# Patient Record
Sex: FEMALE | Race: OTHER | NOT HISPANIC OR LATINO | Employment: OTHER | URBAN - METROPOLITAN AREA
[De-identification: names, ages, dates, MRNs, and addresses within clinical notes are randomized per-mention and may not be internally consistent; named-entity substitution may affect disease eponyms.]

---

## 2020-10-14 ENCOUNTER — NEW PATIENT COMPREHENSIVE (OUTPATIENT)
Dept: URBAN - METROPOLITAN AREA CLINIC 38 | Facility: CLINIC | Age: 55
End: 2020-10-14

## 2020-10-14 DIAGNOSIS — H52.13: ICD-10-CM

## 2020-10-14 DIAGNOSIS — H52.4: ICD-10-CM

## 2020-10-14 DIAGNOSIS — H52.203: ICD-10-CM

## 2020-10-14 PROCEDURE — 92015 DETERMINE REFRACTIVE STATE: CPT

## 2020-10-14 PROCEDURE — 92004 COMPRE OPH EXAM NEW PT 1/>: CPT

## 2020-10-14 ASSESSMENT — TONOMETRY
OD_IOP_MMHG: 17
OS_IOP_MMHG: 16

## 2020-10-14 ASSESSMENT — VISUAL ACUITY
OD_CC: J1
OS_CC: 20/20-1
OS_CC: J1
OS_SC: 20/25
OD_CC: 20/20-2
OD_SC: 20/25

## 2020-10-14 ASSESSMENT — KERATOMETRY
OD_K1POWER_DIOPTERS: 41
OS_AXISANGLE_DEGREES: 155
OS_AXISANGLE2_DEGREES: 65
OD_K2POWER_DIOPTERS: 40.5
OS_K1POWER_DIOPTERS: 40.5
OS_K2POWER_DIOPTERS: 40.75
OD_AXISANGLE2_DEGREES: 105
OD_AXISANGLE_DEGREES: 15

## 2021-02-25 ENCOUNTER — APPOINTMENT (RX ONLY)
Dept: RURAL CLINIC 4 | Facility: CLINIC | Age: 56
Setting detail: DERMATOLOGY
End: 2021-02-25

## 2021-02-25 VITALS — TEMPERATURE: 97.3 F

## 2021-02-25 DIAGNOSIS — Z41.9 ENCOUNTER FOR PROCEDURE FOR PURPOSES OTHER THAN REMEDYING HEALTH STATE, UNSPECIFIED: ICD-10-CM

## 2021-02-25 DIAGNOSIS — L98.8 OTHER SPECIFIED DISORDERS OF THE SKIN AND SUBCUTANEOUS TISSUE: ICD-10-CM

## 2021-02-25 PROCEDURE — ? BOTOX

## 2021-02-25 PROCEDURE — ? ADDITIONAL NOTES

## 2021-02-25 PROCEDURE — ? FILLERS

## 2021-02-25 ASSESSMENT — LOCATION SIMPLE DESCRIPTION DERM
LOCATION SIMPLE: GLABELLA
LOCATION SIMPLE: RIGHT CHEEK
LOCATION SIMPLE: LEFT CHEEK

## 2021-02-25 ASSESSMENT — LOCATION DETAILED DESCRIPTION DERM
LOCATION DETAILED: GLABELLA
LOCATION DETAILED: LEFT CENTRAL MALAR CHEEK
LOCATION DETAILED: RIGHT CENTRAL MALAR CHEEK

## 2021-02-25 ASSESSMENT — LOCATION ZONE DERM: LOCATION ZONE: FACE

## 2021-02-25 NOTE — PROCEDURE: ADDITIONAL NOTES
Render Risk Assessment In Note?: no
Additional Notes: Will consider restylane to marionette lines next visit
Detail Level: Simple

## 2021-02-25 NOTE — PROCEDURE: FILLERS
Detail Level: Detailed
Price (Use Numbers Only, No Special Characters Or $): [address]
Use Map Statement For Sites (Optional): No
Map Statment: See [address] for Complete Details
Anesthesia Type: 1% lidocaine with epinephrine
Additional Anesthesia Volume In Cc: 6
Topical Anesthesia?: 5% lidocaine
Filler: Voluma
Cheeks Filler Volume In Cc: 1
Dorsal Hands Filler  Volume In Cc: 0
Lot #: [id number]
Expiration Date (Month Year): 3/14/2022
Consent: Written consent obtained. Risks include but not limited to bruising, beading, irregular texture, ulceration, infection, allergic reaction, scar formation, incomplete augmentation, temporary nature, procedural pain.
Post-Care Instructions: Patient instructed to apply ice to reduce swelling.

## 2021-02-25 NOTE — PROCEDURE: BOTOX
Mentalis Units: 0
Show Additional Area 5: Yes
Expiration Date (Month Year): 1/2023
Length Of Topical Anesthesia Application (Optional): 15 minutes
Detail Level: Detailed
Show Lcl Units: No
Periorbital Skin Units: [address] [facility]
Price (Use Numbers Only, No Special Characters Or $): [address]
Consent: Written consent obtained. Risks include but not limited to lid/brow ptosis, bruising, swelling, diplopia, temporary effect, incomplete chemical denervation.
Lot #: [id number]
Topical Anesthesia?: 5% lidocaine
Dilution (U/0.1 Cc): 4
Glabellar Complex Units: [address] [facility]
Additional Area 1 Location: perioral 4 units
Post-Care Instructions: Patient instructed to not lie down for 4 hours and limit physical activity for 24 hours.

## 2021-03-10 ENCOUNTER — APPOINTMENT (RX ONLY)
Dept: RURAL CLINIC 4 | Facility: CLINIC | Age: 56
Setting detail: DERMATOLOGY
End: 2021-03-10

## 2021-03-10 DIAGNOSIS — Z41.9 ENCOUNTER FOR PROCEDURE FOR PURPOSES OTHER THAN REMEDYING HEALTH STATE, UNSPECIFIED: ICD-10-CM

## 2021-03-10 DIAGNOSIS — L98.8 OTHER SPECIFIED DISORDERS OF THE SKIN AND SUBCUTANEOUS TISSUE: ICD-10-CM

## 2021-03-10 PROCEDURE — ? COUNSELING

## 2021-03-10 PROCEDURE — ? BOTOX

## 2021-03-10 PROCEDURE — ? RESTYLANE INJECTION

## 2021-03-10 ASSESSMENT — LOCATION ZONE DERM: LOCATION ZONE: FACE

## 2021-03-10 ASSESSMENT — LOCATION SIMPLE DESCRIPTION DERM
LOCATION SIMPLE: RIGHT CHEEK
LOCATION SIMPLE: GLABELLA
LOCATION SIMPLE: LEFT CHEEK

## 2021-03-10 ASSESSMENT — LOCATION DETAILED DESCRIPTION DERM
LOCATION DETAILED: GLABELLA
LOCATION DETAILED: LEFT CENTRAL BUCCAL CHEEK
LOCATION DETAILED: RIGHT CENTRAL BUCCAL CHEEK

## 2021-03-10 NOTE — PROCEDURE: RESTYLANE INJECTION
Additional Area 2 Volume In Cc: 0
Anesthesia Volume In Cc: 0.5
Detail Level: Detailed
Topical Anesthesia?: Numb Master (9.6% lidocaine)
Expiration Date (Month Year): 3/31/2023
Filler: Restylane
Map Statement: See [address] for Complete Details
Price (Use Numbers Only, No Special Characters Or $): [address] [facility] [address]
Anesthesia Type: 1% lidocaine with epinephrine
Lot #: [id number]
Consent: Written consent obtained. Risks include but not limited to bruising, beading, irregular texture, ulceration, infection, allergic reaction, scar formation, incomplete augmentation, temporary nature, procedural pain.
Procedural Text: The filler was administered to the treatment areas noted above.
Marionette Lines Filler  Volume In Cc: 1
Include Cannula Information In Note?: No
Post-Care Instructions: Patient instructed to apply ice to reduce swelling.

## 2021-03-10 NOTE — PROCEDURE: BOTOX
Show Ucl Units: No
Depressor Anguli Oris Units: 0
Show Anterior Platysmal Band Units: Yes
Consent: Written consent obtained. Risks include but not limited to lid/brow ptosis, bruising, swelling, diplopia, temporary effect, incomplete chemical denervation.
Lot #: [id number]
Post-Care Instructions: Patient instructed to not lie down for 4 hours and limit physical activity for 24 hours.
Dilution (U/0.1 Cc): 4
Glabellar Complex Units: 2
Detail Level: Detailed
Expiration Date (Month Year): 8/2023

## 2021-03-10 NOTE — PROCEDURE: MIPS QUALITY
Additional Notes: Patient not of age for pneumonia vaccine
Quality 111:Pneumonia Vaccination Status For Older Adults: Pneumococcal Vaccination not Administered or Previously Received, Reason not Otherwise Specified
Detail Level: Detailed
Quality 130: Documentation Of Current Medications In The Medical Record: Current Medications Documented

## 2021-07-30 ENCOUNTER — APPOINTMENT (RX ONLY)
Dept: RURAL CLINIC 4 | Facility: CLINIC | Age: 56
Setting detail: DERMATOLOGY
End: 2021-07-30

## 2021-07-30 DIAGNOSIS — L98.8 OTHER SPECIFIED DISORDERS OF THE SKIN AND SUBCUTANEOUS TISSUE: ICD-10-CM

## 2021-07-30 PROCEDURE — ? COUNSELING

## 2021-07-30 PROCEDURE — ? ADDITIONAL NOTES

## 2021-07-30 PROCEDURE — ? BOTOX

## 2021-07-30 ASSESSMENT — LOCATION DETAILED DESCRIPTION DERM: LOCATION DETAILED: GLABELLA

## 2021-07-30 ASSESSMENT — LOCATION ZONE DERM: LOCATION ZONE: FACE

## 2021-07-30 ASSESSMENT — LOCATION SIMPLE DESCRIPTION DERM: LOCATION SIMPLE: GLABELLA

## 2021-07-30 NOTE — PROCEDURE: BOTOX
Show Ucl Units: No
Depressor Anguli Oris Units: 4
Nasal Root Units: 0
Show Anterior Platysmal Band Units: Yes
Consent: Written consent obtained. Risks include but not limited to lid/brow ptosis, bruising, swelling, diplopia, temporary effect, incomplete chemical denervation.
Lot #: [id number]
Post-Care Instructions: Patient instructed to not lie down for 4 hours and limit physical activity for 24 hours.
Topical Anesthesia?: Numb Master (9.6% lidocaine)
Additional Area 1 Location: crows feet
Forehead Units: 8
Dilution (U/0.1 Cc): 1
Additional Area 1 Units: [address] [facility]
Glabellar Complex Units: 24
Detail Level: Detailed
Additional Area 2 Location: perioral
Price (Use Numbers Only, No Special Characters Or $): [address]
Expiration Date (Month Year): 10/2023

## 2021-07-30 NOTE — PROCEDURE: ADDITIONAL NOTES
Render Risk Assessment In Note?: no
Additional Notes: Botox and cheek  3 months
Detail Level: Simple

## 2021-09-24 ENCOUNTER — APPOINTMENT (RX ONLY)
Dept: RURAL CLINIC 4 | Facility: CLINIC | Age: 56
Setting detail: DERMATOLOGY
End: 2021-09-24

## 2021-09-24 DIAGNOSIS — Z41.9 ENCOUNTER FOR PROCEDURE FOR PURPOSES OTHER THAN REMEDYING HEALTH STATE, UNSPECIFIED: ICD-10-CM

## 2021-09-24 PROCEDURE — ? BOTOX

## 2021-09-24 PROCEDURE — ? PRESCRIPTION

## 2021-09-24 RX ORDER — TRETIONIN 0.25 MG/G
THIN CREAM TOPICAL QHS
Qty: 20 | Refills: 3 | Status: ERX | COMMUNITY
Start: 2021-09-24

## 2021-09-24 RX ADMIN — TRETIONIN THIN: 0.25 CREAM TOPICAL at 00:00

## 2021-09-24 NOTE — PROCEDURE: BOTOX
Detail Level: Detailed
Show Levator Superior Units: Yes
Price (Use Numbers Only, No Special Characters Or $): [address]
Periorbital Skin Units: 6
Inferior Lateral Orbicularis Oculi Units: 0
Show Mentalis Units: No
Expiration Date (Month Year): 03/24
Depressor Anguli Oris Units: 4
Consent: Written consent obtained. Risks include but not limited to lid/brow ptosis, bruising, swelling, diplopia, temporary effect, incomplete chemical denervation.
Lot #: [id number]
Post-Care Instructions: Patient instructed to not lie down for 4 hours and limit physical activity for 24 hours.
Additional Area 1 Location: eklsey oral
Forehead Units: 2

## 2021-12-09 ENCOUNTER — APPOINTMENT (RX ONLY)
Dept: RURAL CLINIC 4 | Facility: CLINIC | Age: 56
Setting detail: DERMATOLOGY
End: 2021-12-09

## 2021-12-09 DIAGNOSIS — Z41.9 ENCOUNTER FOR PROCEDURE FOR PURPOSES OTHER THAN REMEDYING HEALTH STATE, UNSPECIFIED: ICD-10-CM

## 2021-12-09 PROCEDURE — ? RESTYLANE-L INJECTION

## 2021-12-09 PROCEDURE — ? BOTOX

## 2021-12-09 ASSESSMENT — LOCATION SIMPLE DESCRIPTION DERM
LOCATION SIMPLE: LEFT TEMPLE
LOCATION SIMPLE: RIGHT LIP
LOCATION SIMPLE: LEFT CHEEK
LOCATION SIMPLE: RIGHT CHEEK
LOCATION SIMPLE: GLABELLA
LOCATION SIMPLE: LEFT EYEBROW
LOCATION SIMPLE: LEFT LIP
LOCATION SIMPLE: RIGHT EYEBROW
LOCATION SIMPLE: RIGHT TEMPLE
LOCATION SIMPLE: LEFT EYELID

## 2021-12-09 ASSESSMENT — LOCATION DETAILED DESCRIPTION DERM
LOCATION DETAILED: LEFT SUPERIOR MEDIAL BUCCAL CHEEK
LOCATION DETAILED: LEFT MEDIAL BUCCAL CHEEK
LOCATION DETAILED: LEFT UPPER CUTANEOUS LIP
LOCATION DETAILED: RIGHT INFERIOR TEMPLE
LOCATION DETAILED: RIGHT INFERIOR MEDIAL BUCCAL CHEEK
LOCATION DETAILED: RIGHT SUPERIOR CENTRAL MALAR CHEEK
LOCATION DETAILED: LEFT LOWER CUTANEOUS LIP
LOCATION DETAILED: RIGHT MEDIAL BUCCAL CHEEK
LOCATION DETAILED: LEFT LATERAL CANTHUS
LOCATION DETAILED: GLABELLA
LOCATION DETAILED: LEFT INFERIOR TEMPLE
LOCATION DETAILED: RIGHT LOWER CUTANEOUS LIP
LOCATION DETAILED: LEFT NASOLABIAL FOLD
LOCATION DETAILED: RIGHT MEDIAL EYEBROW
LOCATION DETAILED: RIGHT UPPER CUTANEOUS LIP
LOCATION DETAILED: RIGHT NASOLABIAL FOLD
LOCATION DETAILED: RIGHT INFERIOR MEDIAL MALAR CHEEK
LOCATION DETAILED: LEFT MEDIAL EYEBROW

## 2021-12-09 ASSESSMENT — LOCATION ZONE DERM
LOCATION ZONE: EYELID
LOCATION ZONE: LIP
LOCATION ZONE: FACE

## 2021-12-09 NOTE — PROCEDURE: BOTOX
Additional Area 2 Location: crows feet
Detail Level: Detailed
Show Depressor Anguli Units: Yes
Additional Area 6 Units: 0
Show Right And Left Brow Units: No
Post-Care Instructions: Patient instructed to not lie down for 4 hours and limit physical activity for 24 hours.
Expiration Date (Month Year): 04/2024
Additional Area 2 Units: [address] [facility]
Lot #: [id number]
Additional Area 1 Location: upper lip
Dilution (U/0.1 Cc): 4
Consent: Written consent obtained. Risks include but not limited to lid/brow ptosis, bruising, swelling, diplopia, temporary effect, incomplete chemical denervation.
Glabellar Complex Units: 12

## 2021-12-09 NOTE — PROCEDURE: RESTYLANE-L INJECTION
Nasolabial Folds Filler Volume In Cc: 0.5
Dorsal Hands Filler  Volume In Cc: 0
Anesthesia Type: 1% lidocaine with epinephrine
Use Map Statement For Sites (Optional): No
Number Of Syringes (Required For Inventory): 1
Lot #: [id number]
Consent: Written consent obtained. Risks include but not limited to bruising, beading, irregular texture, ulceration, infection, allergic reaction, scar formation, incomplete augmentation, temporary nature, procedural pain.
Procedural Text: The filler was administered to the treatment areas noted above.
Topical Anesthesia?: 5% lidocaine
Detail Level: Detailed
Post-Care Instructions: Patient instructed to apply ice to reduce swelling.
Map Statement: See [address] for Complete Details
Filler: Restylane -L
Expiration Date (Month Year): 08/31/2023

## 2021-12-22 ENCOUNTER — COMPREHENSIVE EXAM (OUTPATIENT)
Dept: URBAN - METROPOLITAN AREA CLINIC 38 | Facility: CLINIC | Age: 56
End: 2021-12-22

## 2021-12-22 DIAGNOSIS — H52.203: ICD-10-CM

## 2021-12-22 DIAGNOSIS — H52.4: ICD-10-CM

## 2021-12-22 DIAGNOSIS — H52.13: ICD-10-CM

## 2021-12-22 PROCEDURE — 92015 DETERMINE REFRACTIVE STATE: CPT

## 2021-12-22 PROCEDURE — 92014 COMPRE OPH EXAM EST PT 1/>: CPT

## 2021-12-22 ASSESSMENT — KERATOMETRY
OD_K2POWER_DIOPTERS: 40.5
OD_AXISANGLE2_DEGREES: 105
OD_AXISANGLE_DEGREES: 15
OS_AXISANGLE_DEGREES: 155
OD_K1POWER_DIOPTERS: 41
OS_K1POWER_DIOPTERS: 40.5
OS_AXISANGLE2_DEGREES: 65
OS_K2POWER_DIOPTERS: 40.75

## 2021-12-22 ASSESSMENT — VISUAL ACUITY
OS_SC: 20/20
OS_CC: J1
OD_CC: 20/20-1
OS_SC: J3
OD_SC: J3
OS_CC: 20/25+2
OD_CC: J1
OD_SC: 20/25+2

## 2021-12-22 ASSESSMENT — TONOMETRY
OS_IOP_MMHG: 12
OD_IOP_MMHG: 10

## 2022-03-11 ENCOUNTER — APPOINTMENT (RX ONLY)
Dept: RURAL CLINIC 4 | Facility: CLINIC | Age: 57
Setting detail: DERMATOLOGY
End: 2022-03-11

## 2022-03-11 DIAGNOSIS — Z41.9 ENCOUNTER FOR PROCEDURE FOR PURPOSES OTHER THAN REMEDYING HEALTH STATE, UNSPECIFIED: ICD-10-CM

## 2022-03-11 PROCEDURE — ? BOTOX

## 2022-03-11 ASSESSMENT — LOCATION SIMPLE DESCRIPTION DERM
LOCATION SIMPLE: RIGHT LIP
LOCATION SIMPLE: RIGHT FOREHEAD
LOCATION SIMPLE: RIGHT TEMPLE
LOCATION SIMPLE: LEFT EYELID
LOCATION SIMPLE: GLABELLA
LOCATION SIMPLE: LEFT LIP

## 2022-03-11 ASSESSMENT — LOCATION ZONE DERM
LOCATION ZONE: EYELID
LOCATION ZONE: LIP
LOCATION ZONE: FACE

## 2022-03-11 ASSESSMENT — LOCATION DETAILED DESCRIPTION DERM
LOCATION DETAILED: RIGHT INFERIOR TEMPLE
LOCATION DETAILED: RIGHT UPPER CUTANEOUS LIP
LOCATION DETAILED: GLABELLA
LOCATION DETAILED: RIGHT MEDIAL FOREHEAD
LOCATION DETAILED: LEFT UPPER CUTANEOUS LIP
LOCATION DETAILED: LEFT LATERAL CANTHUS

## 2022-03-11 NOTE — PROCEDURE: BOTOX
Mentalis Units: 0
Show Depressor Anguli Units: Yes
Show Right And Left Periorbital Units: No
Post-Care Instructions: Patient instructed to not lie down for 4 hours and limit physical activity for 24 hours.
Glabellar Complex Units: [address] [address]
Consent: Written consent obtained. Risks include but not limited to lid/brow ptosis, bruising, swelling, diplopia, temporary effect, incomplete chemical denervation.
Lot #: [id number]
Detail Level: Detailed
Length Of Topical Anesthesia Application (Optional): 15 minutes
Periorbital Skin Units: [address] [facility]
Dilution (U/0.1 Cc): 4
Forehead Units: 8
Expiration Date (Month Year): 05/2024
Additional Area 1 Location: perioral 4 units

## 2022-03-15 ENCOUNTER — RX ONLY (OUTPATIENT)
Age: 57
Setting detail: RX ONLY
End: 2022-03-15

## 2022-03-15 RX ORDER — TRETIONIN 0.25 MG/G
PEA SIZED AMOUNT CREAM TOPICAL QHS
Qty: 20 | Refills: 3 | Status: ERX

## 2022-12-19 ENCOUNTER — COMPREHENSIVE EXAM (OUTPATIENT)
Dept: URBAN - METROPOLITAN AREA CLINIC 38 | Facility: CLINIC | Age: 57
End: 2022-12-19

## 2022-12-19 PROCEDURE — 92015 DETERMINE REFRACTIVE STATE: CPT

## 2022-12-19 PROCEDURE — 92014 COMPRE OPH EXAM EST PT 1/>: CPT

## 2022-12-19 ASSESSMENT — KERATOMETRY
OD_K2POWER_DIOPTERS: 40.5
OS_K1POWER_DIOPTERS: 40.5
OD_AXISANGLE_DEGREES: 15
OS_AXISANGLE2_DEGREES: 65
OS_AXISANGLE_DEGREES: 155
OD_K1POWER_DIOPTERS: 41
OD_AXISANGLE2_DEGREES: 105
OS_K2POWER_DIOPTERS: 40.75

## 2022-12-19 ASSESSMENT — TONOMETRY
OS_IOP_MMHG: 18
OD_IOP_MMHG: 16

## 2022-12-19 ASSESSMENT — VISUAL ACUITY
OS_SC: J4
OD_SC: 20/25
OS_SC: 20/20
OD_SC: J3-

## 2023-01-09 ENCOUNTER — EMERGENCY VISIT (OUTPATIENT)
Dept: URBAN - METROPOLITAN AREA CLINIC 38 | Facility: CLINIC | Age: 58
End: 2023-01-09

## 2023-01-09 DIAGNOSIS — B02.39: ICD-10-CM

## 2023-01-09 DIAGNOSIS — H04.123: ICD-10-CM

## 2023-01-09 PROCEDURE — 92012 INTRM OPH EXAM EST PATIENT: CPT

## 2023-01-09 ASSESSMENT — VISUAL ACUITY
OS_CC: 20/20-2
OD_CC: 20/20

## 2023-01-09 ASSESSMENT — KERATOMETRY
OS_K2POWER_DIOPTERS: 40.75
OS_AXISANGLE_DEGREES: 155
OD_K2POWER_DIOPTERS: 40.5
OD_AXISANGLE_DEGREES: 15
OS_K1POWER_DIOPTERS: 40.5
OS_AXISANGLE2_DEGREES: 65
OD_AXISANGLE2_DEGREES: 105
OD_K1POWER_DIOPTERS: 41

## 2023-01-09 ASSESSMENT — TONOMETRY
OD_IOP_MMHG: 15
OS_IOP_MMHG: 16

## 2023-01-16 ENCOUNTER — FOLLOW UP (OUTPATIENT)
Dept: URBAN - METROPOLITAN AREA CLINIC 38 | Facility: CLINIC | Age: 58
End: 2023-01-16

## 2023-01-16 DIAGNOSIS — B02.39: ICD-10-CM

## 2023-01-16 DIAGNOSIS — H04.123: ICD-10-CM

## 2023-01-16 PROCEDURE — 92012 INTRM OPH EXAM EST PATIENT: CPT

## 2023-01-16 RX ORDER — MOXIFLOXACIN HYDROCHLORIDE 5 MG/ML: 1 SOLUTION/ DROPS OPHTHALMIC

## 2023-01-16 RX ORDER — ERYTHROMYCIN 5 MG/G: OINTMENT OPHTHALMIC EVERY EVENING

## 2023-01-16 ASSESSMENT — TONOMETRY
OD_IOP_MMHG: 13
OS_IOP_MMHG: 14

## 2023-01-16 ASSESSMENT — KERATOMETRY
OD_K1POWER_DIOPTERS: 41
OS_AXISANGLE_DEGREES: 155
OD_AXISANGLE_DEGREES: 15
OD_K2POWER_DIOPTERS: 40.5
OD_AXISANGLE2_DEGREES: 105
OS_AXISANGLE2_DEGREES: 65
OS_K2POWER_DIOPTERS: 40.75
OS_K1POWER_DIOPTERS: 40.5

## 2023-01-16 ASSESSMENT — VISUAL ACUITY
OS_CC: 20/20
OU_CC: 20/20
OD_CC: 20/25

## 2023-01-20 ENCOUNTER — FOLLOW UP (OUTPATIENT)
Dept: URBAN - METROPOLITAN AREA CLINIC 38 | Facility: CLINIC | Age: 58
End: 2023-01-20

## 2023-01-20 DIAGNOSIS — B02.39: ICD-10-CM

## 2023-01-20 DIAGNOSIS — H04.123: ICD-10-CM

## 2023-01-20 PROCEDURE — 92012 INTRM OPH EXAM EST PATIENT: CPT

## 2023-01-20 PROCEDURE — 68761Q PUNCTAL PLUG/QUINTESS DISSOLVABLE PLUG/EACH

## 2023-01-20 PROCEDURE — A4262 TEMPORARY TEAR DUCT PLUG: HCPCS

## 2023-01-20 ASSESSMENT — VISUAL ACUITY
OS_CC: 20/20
OD_CC: 20/20-
OU_CC: 20/20

## 2023-01-20 ASSESSMENT — KERATOMETRY
OS_AXISANGLE2_DEGREES: 65
OD_K2POWER_DIOPTERS: 40.5
OS_K2POWER_DIOPTERS: 40.75
OD_AXISANGLE2_DEGREES: 105
OD_AXISANGLE_DEGREES: 15
OD_K1POWER_DIOPTERS: 41
OS_AXISANGLE_DEGREES: 155
OS_K1POWER_DIOPTERS: 40.5

## 2023-01-20 ASSESSMENT — TONOMETRY
OS_IOP_MMHG: 16
OD_IOP_MMHG: 16

## 2023-01-23 ENCOUNTER — RX ONLY (OUTPATIENT)
Age: 58
Setting detail: RX ONLY
End: 2023-01-23

## 2023-01-23 ENCOUNTER — FOLLOW UP (OUTPATIENT)
Dept: URBAN - METROPOLITAN AREA CLINIC 38 | Facility: CLINIC | Age: 58
End: 2023-01-23

## 2023-01-23 DIAGNOSIS — B02.39: ICD-10-CM

## 2023-01-23 DIAGNOSIS — H04.123: ICD-10-CM

## 2023-01-23 PROCEDURE — 92012 INTRM OPH EXAM EST PATIENT: CPT

## 2023-01-23 RX ORDER — PREDNISOLONE ACETATE 10 MG/ML: 1 SUSPENSION/ DROPS OPHTHALMIC

## 2023-01-23 RX ORDER — TRETIONIN 0.25 MG/G
PEA SIZED AMOUNT CREAM TOPICAL QHS
Qty: 20 | Refills: 3 | Status: ERX

## 2023-01-23 ASSESSMENT — KERATOMETRY
OD_K2POWER_DIOPTERS: 40.5
OS_K2POWER_DIOPTERS: 40.75
OD_AXISANGLE_DEGREES: 15
OD_K1POWER_DIOPTERS: 41
OS_K1POWER_DIOPTERS: 40.5
OD_AXISANGLE2_DEGREES: 105
OS_AXISANGLE2_DEGREES: 65
OS_AXISANGLE_DEGREES: 155

## 2023-01-23 ASSESSMENT — TONOMETRY
OS_IOP_MMHG: 16
OD_IOP_MMHG: 17

## 2023-01-23 ASSESSMENT — VISUAL ACUITY
OS_SC: 20/25
OU_SC: 20/25
OD_SC: 20/25

## 2023-01-30 ENCOUNTER — FOLLOW UP (OUTPATIENT)
Dept: URBAN - METROPOLITAN AREA CLINIC 38 | Facility: CLINIC | Age: 58
End: 2023-01-30

## 2023-01-30 DIAGNOSIS — B02.39: ICD-10-CM

## 2023-01-30 DIAGNOSIS — H04.123: ICD-10-CM

## 2023-01-30 PROCEDURE — 92012 INTRM OPH EXAM EST PATIENT: CPT

## 2023-01-30 ASSESSMENT — KERATOMETRY
OD_K1POWER_DIOPTERS: 41
OS_K1POWER_DIOPTERS: 40.5
OS_AXISANGLE_DEGREES: 155
OD_AXISANGLE_DEGREES: 15
OS_AXISANGLE2_DEGREES: 65
OD_AXISANGLE2_DEGREES: 105
OS_K2POWER_DIOPTERS: 40.75
OD_K2POWER_DIOPTERS: 40.5

## 2023-01-30 ASSESSMENT — TONOMETRY
OS_IOP_MMHG: 17
OD_IOP_MMHG: 16

## 2023-01-30 ASSESSMENT — VISUAL ACUITY
OD_SC: 20/25-2
OS_SC: 20/25

## 2023-12-27 ASSESSMENT — KERATOMETRY
OD_AXISANGLE2_DEGREES: 105
OS_K2POWER_DIOPTERS: 40.75
OS_AXISANGLE2_DEGREES: 65
OD_K2POWER_DIOPTERS: 40.5
OD_K1POWER_DIOPTERS: 41
OS_K1POWER_DIOPTERS: 40.5
OS_AXISANGLE_DEGREES: 155
OD_AXISANGLE_DEGREES: 15

## 2023-12-28 ENCOUNTER — COMPREHENSIVE EXAM (OUTPATIENT)
Dept: URBAN - METROPOLITAN AREA CLINIC 43 | Facility: CLINIC | Age: 58
End: 2023-12-28

## 2023-12-28 DIAGNOSIS — H52.4: ICD-10-CM

## 2023-12-28 DIAGNOSIS — H52.13: ICD-10-CM

## 2023-12-28 DIAGNOSIS — H52.203: ICD-10-CM

## 2023-12-28 PROCEDURE — 92014 COMPRE OPH EXAM EST PT 1/>: CPT

## 2023-12-28 PROCEDURE — 92015 DETERMINE REFRACTIVE STATE: CPT

## 2023-12-28 ASSESSMENT — VISUAL ACUITY
OU_CC: J1
OU_SC: J2-
OS_SC: J4+
OD_SC: 20/30+2
OD_CC: 20/25+1
OS_CC: J1-
OU_SC: 20/20
OU_CC: 20/20
OS_CC: 20/20
OD_BAT: 20/20-2
OS_BAT: 20/20-1
OD_SC: J6-
OD_CC: J2
OS_SC: 20/20

## 2023-12-28 ASSESSMENT — TONOMETRY
OD_IOP_MMHG: 16
OS_IOP_MMHG: 15

## 2024-11-25 ENCOUNTER — COMPREHENSIVE EXAM (OUTPATIENT)
Dept: URBAN - METROPOLITAN AREA CLINIC 38 | Facility: CLINIC | Age: 59
End: 2024-11-25

## 2024-11-25 DIAGNOSIS — H52.13: ICD-10-CM

## 2024-11-25 DIAGNOSIS — H52.203: ICD-10-CM

## 2024-11-25 DIAGNOSIS — H52.4: ICD-10-CM

## 2024-11-25 PROCEDURE — 92015 DETERMINE REFRACTIVE STATE: CPT

## 2024-11-25 PROCEDURE — 92014 COMPRE OPH EXAM EST PT 1/>: CPT
